# Patient Record
Sex: FEMALE | Race: WHITE | NOT HISPANIC OR LATINO | Employment: FULL TIME | URBAN - METROPOLITAN AREA
[De-identification: names, ages, dates, MRNs, and addresses within clinical notes are randomized per-mention and may not be internally consistent; named-entity substitution may affect disease eponyms.]

---

## 2023-05-16 ENCOUNTER — OFFICE VISIT (OUTPATIENT)
Dept: INTERNAL MEDICINE | Facility: CLINIC | Age: 48
End: 2023-05-16
Payer: MEDICAID

## 2023-05-16 VITALS
BODY MASS INDEX: 25.79 KG/M2 | HEART RATE: 83 BPM | SYSTOLIC BLOOD PRESSURE: 110 MMHG | TEMPERATURE: 98.4 F | WEIGHT: 170.2 LBS | DIASTOLIC BLOOD PRESSURE: 80 MMHG | HEIGHT: 68 IN | OXYGEN SATURATION: 95 %

## 2023-05-16 DIAGNOSIS — Z13.1 SCREENING FOR DIABETES MELLITUS: ICD-10-CM

## 2023-05-16 DIAGNOSIS — Z13.220 SCREENING FOR HYPERLIPIDEMIA: ICD-10-CM

## 2023-05-16 DIAGNOSIS — M72.2 PLANTAR FASCIITIS: ICD-10-CM

## 2023-05-16 DIAGNOSIS — Z13.0 SCREENING FOR DEFICIENCY ANEMIA: ICD-10-CM

## 2023-05-16 DIAGNOSIS — Z13.29 SCREENING FOR HYPOTHYROIDISM: ICD-10-CM

## 2023-05-16 DIAGNOSIS — M62.08 DIASTASIS RECTI: ICD-10-CM

## 2023-05-16 DIAGNOSIS — K80.20 CALCULUS OF GALLBLADDER WITHOUT CHOLECYSTITIS WITHOUT OBSTRUCTION: Primary | ICD-10-CM

## 2023-05-16 DIAGNOSIS — L72.0 CYST OF SKIN AND SUBCUTANEOUS TISSUE: ICD-10-CM

## 2023-05-16 PROCEDURE — 99203 OFFICE O/P NEW LOW 30 MIN: CPT | Performed by: INTERNAL MEDICINE

## 2023-05-16 PROCEDURE — 1160F RVW MEDS BY RX/DR IN RCRD: CPT | Performed by: INTERNAL MEDICINE

## 2023-05-16 PROCEDURE — 1159F MED LIST DOCD IN RCRD: CPT | Performed by: INTERNAL MEDICINE

## 2023-05-16 NOTE — PROGRESS NOTES
"Chief Complaint  Establish Care (Moved from [place], wanting labs) and Abdominal Pain (From )    Subjective        Mack Sorensen presents to [facility] PRIMARY CARE  History of Present Illness     Ms. Sorensen is a ramón 47 yo F who presents to establish care and discuss allergies, post-partum changes after <1 yo.  Had first  as well.     Goal weight of 155 lbs.  Had been on Adipex and Topamax to help with weight loss and she did lose 20 lbs, but had some bleeding with diarrhea.  Now this has stopped since she stopped Adipex and Topamax.     Plantar fasciitis: recommended inserts, exercises      Objective   Vital Signs:  /80 (BP Location: Left arm, Patient Position: Sitting, Cuff Size: Adult)   Pulse 83   Temp 98.4 °F (36.9 °C) (Temporal)   Ht 172.7 cm (68\")   Wt 77.2 kg (170 lb 3.2 oz)   SpO2 95%   BMI 25.88 kg/m²   Estimated body mass index is 25.88 kg/m² as calculated from the following:    Height as of this encounter: 172.7 cm (68\").    Weight as of this encounter: 77.2 kg (170 lb 3.2 oz).       BMI is >= 25 and <30. (Overweight) The following options were offered after discussion;: exercise counseling/recommendations and nutrition counseling/recommendations      Physical Exam  Vitals reviewed.   Constitutional:       General: She is not in acute distress.     Appearance: Normal appearance.   HENT:      Head: Normocephalic and atraumatic.      Nose: Nose normal.      Mouth/Throat:      Mouth: Mucous membranes are moist.   Eyes:      Conjunctiva/sclera: Conjunctivae normal.   Cardiovascular:      Rate and Rhythm: Normal rate and regular rhythm.      Pulses: Normal pulses.      Heart sounds: Normal heart sounds.   Pulmonary:      Effort: Pulmonary effort is normal.      Breath sounds: Normal breath sounds.   Abdominal:      Palpations: Abdomen is soft.      Tenderness: There is no abdominal tenderness.   Musculoskeletal:      Right lower leg: No edema.      Left lower " leg: No edema.   Skin:     General: Skin is warm and dry.   Neurological:      General: No focal deficit present.      Mental Status: She is alert.   Psychiatric:         Mood and Affect: Mood normal.        Result Review :  The following data was reviewed by: Anjelica Holguin MD on 05/16/2023:  Common labs        5/16/2023    11:37   Common Labs   Glucose 95     BUN 13     Creatinine 0.76     Sodium 138     Potassium 4.2     Chloride 102     Calcium 10.1     Total Protein 7.8     Albumin 4.8     Total Bilirubin 0.4     Alkaline Phosphatase 63     AST (SGOT) 15     ALT (SGPT) 22     WBC 5.57     Hemoglobin 12.2     Hematocrit 37.1     Platelets 271     Total Cholesterol 231     Triglycerides 82     HDL Cholesterol 60     LDL Cholesterol  157     Hemoglobin A1C 5.30       Data reviewed: imaging available in the system, CT from 2021 reviewed with diastasis recti and cholelithiasis             Assessment and Plan   Diagnoses and all orders for this visit:    1. Calculus of gallbladder without cholecystitis without obstruction (Primary)  -     Comprehensive Metabolic Panel    2. Screening for hypothyroidism  -     T4, Free  -     TSH    3. Screening for deficiency anemia  -     CBC & Differential    4. Screening for diabetes mellitus  -     Hemoglobin A1c    5. Screening for hyperlipidemia  -     Lipid Panel With LDL / HDL Ratio    6. Diastasis recti  Assessment & Plan:  Message sent to general surgery to see if they would be appropriate referral      7. Cyst of skin and subcutaneous tissue  -     US soft tissue    8. Plantar fasciitis  Assessment & Plan:  Exercises, supportive shoes, inserts described and discussed how to use             Follow Up   No follow-ups on file.  Patient was given instructions and counseling regarding her condition or for health maintenance advice. Please see specific information pulled into the AVS if appropriate.

## 2023-05-17 LAB
ALBUMIN SERPL-MCNC: 4.8 G/DL (ref 3.5–5.2)
ALBUMIN/GLOB SERPL: 1.6 G/DL
ALP SERPL-CCNC: 63 U/L (ref 39–117)
ALT SERPL-CCNC: 22 U/L (ref 1–33)
AST SERPL-CCNC: 15 U/L (ref 1–32)
BASOPHILS # BLD AUTO: 0.04 10*3/MM3 (ref 0–0.2)
BASOPHILS NFR BLD AUTO: 0.7 % (ref 0–1.5)
BILIRUB SERPL-MCNC: 0.4 MG/DL (ref 0–1.2)
BUN SERPL-MCNC: 13 MG/DL (ref 6–20)
BUN/CREAT SERPL: 17.1 (ref 7–25)
CALCIUM SERPL-MCNC: 10.1 MG/DL (ref 8.6–10.5)
CHLORIDE SERPL-SCNC: 102 MMOL/L (ref 98–107)
CHOLEST SERPL-MCNC: 231 MG/DL (ref 0–200)
CO2 SERPL-SCNC: 27.5 MMOL/L (ref 22–29)
CREAT SERPL-MCNC: 0.76 MG/DL (ref 0.57–1)
EGFRCR SERPLBLD CKD-EPI 2021: 96.2 ML/MIN/1.73
EOSINOPHIL # BLD AUTO: 0.16 10*3/MM3 (ref 0–0.4)
EOSINOPHIL NFR BLD AUTO: 2.9 % (ref 0.3–6.2)
ERYTHROCYTE [DISTWIDTH] IN BLOOD BY AUTOMATED COUNT: 13.2 % (ref 12.3–15.4)
GLOBULIN SER CALC-MCNC: 3 GM/DL
GLUCOSE SERPL-MCNC: 95 MG/DL (ref 65–99)
HBA1C MFR BLD: 5.3 % (ref 4.8–5.6)
HCT VFR BLD AUTO: 37.1 % (ref 34–46.6)
HDLC SERPL-MCNC: 60 MG/DL (ref 40–60)
HGB BLD-MCNC: 12.2 G/DL (ref 12–15.9)
IMM GRANULOCYTES # BLD AUTO: 0.01 10*3/MM3 (ref 0–0.05)
IMM GRANULOCYTES NFR BLD AUTO: 0.2 % (ref 0–0.5)
LDLC SERPL CALC-MCNC: 157 MG/DL (ref 0–100)
LDLC/HDLC SERPL: 2.58 {RATIO}
LYMPHOCYTES # BLD AUTO: 1.57 10*3/MM3 (ref 0.7–3.1)
LYMPHOCYTES NFR BLD AUTO: 28.2 % (ref 19.6–45.3)
MCH RBC QN AUTO: 29.2 PG (ref 26.6–33)
MCHC RBC AUTO-ENTMCNC: 32.9 G/DL (ref 31.5–35.7)
MCV RBC AUTO: 88.8 FL (ref 79–97)
MONOCYTES # BLD AUTO: 0.71 10*3/MM3 (ref 0.1–0.9)
MONOCYTES NFR BLD AUTO: 12.7 % (ref 5–12)
NEUTROPHILS # BLD AUTO: 3.08 10*3/MM3 (ref 1.7–7)
NEUTROPHILS NFR BLD AUTO: 55.3 % (ref 42.7–76)
NRBC BLD AUTO-RTO: 0 /100 WBC (ref 0–0.2)
PLATELET # BLD AUTO: 271 10*3/MM3 (ref 140–450)
POTASSIUM SERPL-SCNC: 4.2 MMOL/L (ref 3.5–5.2)
PROT SERPL-MCNC: 7.8 G/DL (ref 6–8.5)
RBC # BLD AUTO: 4.18 10*6/MM3 (ref 3.77–5.28)
SODIUM SERPL-SCNC: 138 MMOL/L (ref 136–145)
T4 FREE SERPL-MCNC: 1 NG/DL (ref 0.93–1.7)
TRIGL SERPL-MCNC: 82 MG/DL (ref 0–150)
TSH SERPL DL<=0.005 MIU/L-ACNC: 1.9 UIU/ML (ref 0.27–4.2)
VLDLC SERPL CALC-MCNC: 14 MG/DL (ref 5–40)
WBC # BLD AUTO: 5.57 10*3/MM3 (ref 3.4–10.8)

## 2023-05-18 PROBLEM — M72.2 PLANTAR FASCIITIS: Status: ACTIVE | Noted: 2023-05-18

## 2023-05-18 PROBLEM — M62.08 DIASTASIS RECTI: Status: ACTIVE | Noted: 2023-05-18

## 2023-05-18 PROBLEM — K80.20 CALCULUS OF GALLBLADDER WITHOUT CHOLECYSTITIS WITHOUT OBSTRUCTION: Status: ACTIVE | Noted: 2023-05-18

## 2023-05-19 ENCOUNTER — TELEPHONE (OUTPATIENT)
Dept: INTERNAL MEDICINE | Facility: CLINIC | Age: 48
End: 2023-05-19
Payer: MEDICAID

## 2023-05-19 NOTE — TELEPHONE ENCOUNTER
Pt calls wants her US placed as URGENT. Per pt she can't get a soon appt w/o the order getting placed as STAT. I advise that she could go to Urgent care if she was having that much pain.  Pt wants a call back to let her know if she is getting the order placed or what she needs to do because she is having more pain. Thank you

## 2023-05-22 ENCOUNTER — HOSPITAL ENCOUNTER (OUTPATIENT)
Dept: ULTRASOUND IMAGING | Facility: HOSPITAL | Age: 48
Discharge: HOME | End: 2023-05-22
Admitting: INTERNAL MEDICINE
Payer: MEDICAID

## 2023-05-22 PROCEDURE — 76999 ECHO EXAMINATION PROCEDURE: CPT

## 2023-05-23 ENCOUNTER — OFFICE VISIT (OUTPATIENT)
Dept: ORTHOPEDIC SURGERY | Facility: CLINIC | Age: 48
End: 2023-05-23
Payer: MEDICAID

## 2023-05-23 VITALS
WEIGHT: 170 LBS | DIASTOLIC BLOOD PRESSURE: 78 MMHG | HEIGHT: 68 IN | SYSTOLIC BLOOD PRESSURE: 115 MMHG | BODY MASS INDEX: 25.76 KG/M2 | HEART RATE: 66 BPM

## 2023-05-23 DIAGNOSIS — L02.416 ABSCESS OF SKIN OF LEFT KNEE: Primary | ICD-10-CM

## 2023-05-23 RX ORDER — SENNOSIDES 8.6 MG
650 CAPSULE ORAL EVERY 8 HOURS PRN
COMMUNITY

## 2023-05-23 RX ORDER — SULFAMETHOXAZOLE AND TRIMETHOPRIM 800; 160 MG/1; MG/1
2 TABLET ORAL 2 TIMES DAILY
COMMUNITY

## 2023-05-23 RX ORDER — IBUPROFEN 600 MG/1
600 TABLET ORAL EVERY 6 HOURS PRN
COMMUNITY

## 2023-05-23 NOTE — PROGRESS NOTES
"Subjective:     Patient ID: Mack Sorensen is a 48 y.o. female.    Chief Complaint:    History of Present Illness  Mack Sorensen presents to clinic today for evaluation of left knee prepatellar cyst/abscess.  The patient states that she is had multiple issues with this area flaring up intermittently in the past since a puncture wound to the area in roughly 2016.  She is not sure exactly what she punctured her knee on but she does spend quite a bit of time on her knees as reported by the patient.  She states that a few days ago she started to notice that it flared up again and she was given antibiotics (Bactrim DS twice daily) but only took it 1 or 2 days and is starting to get better so she stopped.  About a day or 2 ago it started to flare back up again so she began taking antibiotics again and had an ultrasound at the recommendation of Dr. Holguin.  The patient presents to me today for further evaluation and management.  She states that it is extremely tender and she did note some purulent drainage the day prior but states that today the area is only half the size that it was a day before and she does feel as though it is improving.  She has only taking 2 antibiotic tablets prior to seeing me.     Social History     Occupational History   • Not on file   Tobacco Use   • Smoking status: Never   • Smokeless tobacco: Never   Substance and Sexual Activity   • Alcohol use: Yes     Alcohol/week: 1.0 standard drink     Types: 1 Drinks containing 0.5 oz of alcohol per week   • Drug use: Never   • Sexual activity: Yes     Partners: Male     Birth control/protection: Tubal ligation     Comment: 07/26/2021      History reviewed. No pertinent past medical history.  No past surgical history on file.    Family History   Problem Relation Age of Onset   • Diabetes Mother                  Objective:  Vitals:    05/23/23 1506   BP: 115/78   Pulse: 66   Weight: 77.1 kg (170 lb)   Height: 172.7 cm (68\")         05/23/23  1506   Weight: " 77.1 kg (170 lb)     Body mass index is 25.85 kg/m².        Left Knee Exam     Muscle Strength   The patient has normal left knee strength.    Tenderness   Left knee tenderness location: prepatellar bursa/abscess.    Range of Motion   Extension: normal   Flexion: normal     Tests   Chirag:  Medial - negative Lateral - negative  Varus: negative Valgus: negative  Drawer:  Anterior - negative     Posterior - negative    Other   Erythema: present  Scars: absent  Sensation: normal  Pulse: present  Swelling: moderate  Effusion: no effusion present    Comments:  Small dime sized area of erythema induration just inferior to the patella.  No active fluctuance or purulence noted               Imagin views of the left knee were ordered and reviewed by myself in the office today  Indication: Left knee pain  Findings: X-rays demonstrate no acute osseous abnormality.  No degenerative changes noted.  No signs of fracture dislocation or subluxation.  No suprapatellar effusion there is anterior infrapatellar soft tissue swelling  Comparative studies: None    Ultrasound was reviewed by myself in the office today demonstrating likely a infrapatellar abscess/septic bursitis    Assessment:        1. Abscess of skin of left knee           Plan:          1. Discussed treatment options at length with patient at today's visit.  I discussed with the patient that at this point in time since she states that the red area and swelling has decreased by more than half with just 1 day of oral antibiotics I would recommend continued observation and oral antibiotic therapy.  She has been taking Bactrim DS twice daily at the discretion of her primary care doctor.  I recommend she continue with the Bactrim DS twice daily for total of 7 days.  I will plan to see the patient back next Monday for repeat evaluation if at that point time the area has either not resolved or has grown we will consider surgical incision and drainage and possible  bursectomy.  I discussed with her that if she notices increasing swelling pain redness or drainage over the next few days to please call the office and get in touch with me and we can come up with next steps.  I am not eager to treat her abscess surgically since it has shown promising results with just 1 day 1 antibiotic therapy.  I also recommended warm compresses and have some bath warm water soaks  2. Follow up: 1 week without x-rays      Mack Sorensen was in agreement with plan and had all questions answered.     Medications:  No orders of the defined types were placed in this encounter.      Followup:  No follow-ups on file.    Diagnoses and all orders for this visit:    1. Abscess of skin of left knee (Primary)  -     XR Knee 3+ View With [place] Left          Dictated utilizing Dragon dictation

## 2023-06-05 ENCOUNTER — TELEPHONE (OUTPATIENT)
Dept: INTERNAL MEDICINE | Facility: CLINIC | Age: 48
End: 2023-06-05
Payer: MEDICAID

## 2023-06-05 DIAGNOSIS — K43.9 HERNIA OF ANTERIOR ABDOMINAL WALL: ICD-10-CM

## 2023-06-05 DIAGNOSIS — M62.08 DIASTASIS RECTI: Primary | ICD-10-CM

## 2023-06-05 NOTE — PROGRESS NOTES
Spoke with patient today.  She is having worsening abdominal pain but no skin changes over hernia, vomiting, constipation, other signs of obstruction.  Advised if she is in severe pain to go to ED, but also advised on exact signs of obstruction.  Referral for general surgery placed as well as CT of abdomen for surgical planning purposes.

## 2023-06-05 NOTE — TELEPHONE ENCOUNTER
I told her to go to the ER having severe gut pain and the fever and sweats.  She would like to speak with Dr. Holguin first.  She said she was suppose to have a general surgeon or plastic surgeon call her?   She was told to go to the er

## 2023-06-15 ENCOUNTER — HOSPITAL ENCOUNTER (OUTPATIENT)
Dept: CT IMAGING | Facility: HOSPITAL | Age: 48
Discharge: HOME | End: 2023-06-15
Admitting: INTERNAL MEDICINE
Payer: MEDICAID

## 2023-06-15 DIAGNOSIS — K43.9 HERNIA OF ANTERIOR ABDOMINAL WALL: ICD-10-CM

## 2023-06-15 DIAGNOSIS — M62.08 DIASTASIS RECTI: ICD-10-CM

## 2023-06-15 PROCEDURE — 74176 CT ABD & PELVIS W/O CONTRAST: CPT

## 2023-06-19 ENCOUNTER — OFFICE VISIT (OUTPATIENT)
Dept: SURGERY | Facility: CLINIC | Age: 48
End: 2023-06-19
Payer: MEDICAID

## 2023-06-19 VITALS
SYSTOLIC BLOOD PRESSURE: 124 MMHG | HEART RATE: 72 BPM | WEIGHT: 175 LBS | BODY MASS INDEX: 26.52 KG/M2 | DIASTOLIC BLOOD PRESSURE: 76 MMHG | RESPIRATION RATE: 16 BRPM | HEIGHT: 68 IN

## 2023-06-19 DIAGNOSIS — K43.2 INCISIONAL HERNIA, WITHOUT OBSTRUCTION OR GANGRENE: Primary | ICD-10-CM

## 2023-06-19 PROCEDURE — 1160F RVW MEDS BY RX/DR IN RCRD: CPT | Performed by: SURGERY

## 2023-06-19 PROCEDURE — 1159F MED LIST DOCD IN RCRD: CPT | Performed by: SURGERY

## 2023-06-19 PROCEDURE — 99204 OFFICE O/P NEW MOD 45 MIN: CPT | Performed by: SURGERY

## 2023-06-19 NOTE — H&P
Mack Sorensen 48 y.o. female presents @ the req of Dr. Holguin  for eval of poss abd hernia.  Pt states she had an emergency  2021 and since that time she has noticed abd pain and bulging.  + pain.  Wears abd binder to help with the pain.  Pt reports this was her 1st , 7 other preg with vag delivery.   Chief Complaint   Patient presents with    Abdominal Pain             HPI   Above noted and agree.  This very pleasant 48-year-old female is here with an incisional hernia.  She had an emergency  almost 2 years ago.  At that time she had a small incisional hernia and it has grown in size.  She has had 8 pregnancies total and was told it was just diastases rectus.  She does get crampy abdominal pain after eating.  She has no chest pain or shortness of breath.  She has no fevers or chills.  She has to wear a binder.      Review of Systems   All other systems reviewed and are negative.          Current Outpatient Medications:     acetaminophen (TYLENOL) 650 MG 8 hr tablet, Take 1 tablet by mouth Every 8 (Eight) Hours As Needed for Mild Pain., Disp: , Rfl:     ibuprofen (ADVIL,MOTRIN) 600 MG tablet, Take 1 tablet by mouth Every 6 (Six) Hours As Needed for Mild Pain., Disp: , Rfl:         No Known Allergies        Past Medical History:   Diagnosis Date    Cholelithiasis 2021           No past surgical history on file.        Social History     Tobacco Use    Smoking status: Never    Smokeless tobacco: Never   Substance Use Topics    Alcohol use: Yes     Alcohol/week: 1.0 standard drink     Types: 1 Drinks containing 0.5 oz of alcohol per week    Drug use: Never             There is no immunization history on file for this patient.        Physical Exam  Vitals and nursing note reviewed.   Constitutional:       Appearance: Normal appearance.   HENT:      Head: Normocephalic and atraumatic.   Cardiovascular:      Rate and Rhythm: Normal rate and regular rhythm.   Pulmonary:      Effort:  "Pulmonary effort is normal.      Breath sounds: Normal breath sounds.   Abdominal:      General: Bowel sounds are normal.      Palpations: Abdomen is soft.      Comments: Incisional hernia noted in lower abdomen   Musculoskeletal:         General: No swelling or tenderness.   Skin:     General: Skin is warm and dry.   Neurological:      General: No focal deficit present.      Mental Status: She is alert and oriented to person, place, and time.   Psychiatric:         Mood and Affect: Mood normal.         Behavior: Behavior normal.       Debilities/Disabilities Identified: None    Emotional Behavior: Appropriate      /76   Pulse 72   Resp 16   Ht 172.7 cm (68\")   Wt 79.4 kg (175 lb)   BMI 26.61 kg/m²         Diagnoses and all orders for this visit:    1. Incisional hernia, without obstruction or gangrene (Primary)       The risks and benefits of repairing this hernia laparoscopically were discussed with Mack.  Benefits and risks, not limited to but including:  Bleeding, infection, recurrence, formation of a hematoma or seroma, injury to intra-abdominal structures, DVT, PE, atelectasis, pneumonia, anesthesia complications.  She appeared to understand and is willing to proceed.    Thank you for allowing me to participate in the care of this interesting patient.           "

## 2023-06-19 NOTE — LETTER
2023       No Recipients    Patient: Mack Sorensen   YOB: 1975   Date of Visit: 2023       Dear Dr. Jimenez Recipients:    Thank you for referring Mack Sorensen to me for evaluation. Below are the relevant portions of my assessment and plan of care.    If you have questions, please do not hesitate to call me. I look forward to following Mack along with you.         Sincerely,        Hawa Guerra DO        CC:   No Recipients    Hawa Guerra DO  23 1004  Sign when Signing Visit  Mack Sorensen 48 y.o. female presents @ the req of Dr. Holguin  for eval of poss abd hernia.  Pt states she had an emergency  2021 and since that time she has noticed abd pain and bulging.  + pain.  Wears abd binder to help with the pain.  Pt reports this was her 1st , 7 other preg with vag delivery.   Chief Complaint   Patient presents with   • Abdominal Pain             HPI   Above noted and agree.  This very pleasant 48-year-old female is here with an incisional hernia.  She had an emergency  almost 2 years ago.  At that time she had a small incisional hernia and it has grown in size.  She has had 8 pregnancies total and was told it was just diastases rectus.  She does get crampy abdominal pain after eating.  She has no chest pain or shortness of breath.  She has no fevers or chills.  She has to wear a binder.      Review of Systems   All other systems reviewed and are negative.          Current Outpatient Medications:   •  acetaminophen (TYLENOL) 650 MG 8 hr tablet, Take 1 tablet by mouth Every 8 (Eight) Hours As Needed for Mild Pain., Disp: , Rfl:   •  ibuprofen (ADVIL,MOTRIN) 600 MG tablet, Take 1 tablet by mouth Every 6 (Six) Hours As Needed for Mild Pain., Disp: , Rfl:         No Known Allergies        Past Medical History:   Diagnosis Date   • Cholelithiasis 2021           No past surgical history on file.        Social History     Tobacco Use   • Smoking  "status: Never   • Smokeless tobacco: Never   Substance Use Topics   • Alcohol use: Yes     Alcohol/week: 1.0 standard drink     Types: 1 Drinks containing 0.5 oz of alcohol per week   • Drug use: Never             There is no immunization history on file for this patient.        Physical Exam  Vitals and nursing note reviewed.   Constitutional:       Appearance: Normal appearance.   HENT:      Head: Normocephalic and atraumatic.   Cardiovascular:      Rate and Rhythm: Normal rate and regular rhythm.   Pulmonary:      Effort: Pulmonary effort is normal.      Breath sounds: Normal breath sounds.   Abdominal:      General: Bowel sounds are normal.      Palpations: Abdomen is soft.      Comments: Incisional hernia noted in lower abdomen   Musculoskeletal:         General: No swelling or tenderness.   Skin:     General: Skin is warm and dry.   Neurological:      General: No focal deficit present.      Mental Status: She is alert and oriented to person, place, and time.   Psychiatric:         Mood and Affect: Mood normal.         Behavior: Behavior normal.       Debilities/Disabilities Identified: None    Emotional Behavior: Appropriate      /76   Pulse 72   Resp 16   Ht 172.7 cm (68\")   Wt 79.4 kg (175 lb)   BMI 26.61 kg/m²         Diagnoses and all orders for this visit:    1. Incisional hernia, without obstruction or gangrene (Primary)       The risks and benefits of repairing this hernia laparoscopically were discussed with Mack.  Benefits and risks, not limited to but including:  Bleeding, infection, recurrence, formation of a hematoma or seroma, injury to intra-abdominal structures, DVT, PE, atelectasis, pneumonia, anesthesia complications.  She appeared to understand and is willing to proceed.    Thank you for allowing me to participate in the care of this interesting patient.         "

## 2023-06-20 PROBLEM — K43.2 INCISIONAL HERNIA, WITHOUT OBSTRUCTION OR GANGRENE: Status: ACTIVE | Noted: 2023-06-20

## 2023-07-13 PROBLEM — K43.2 INCISIONAL HERNIA, WITHOUT OBSTRUCTION OR GANGRENE: Status: RESOLVED | Noted: 2023-06-20 | Resolved: 2023-07-13

## 2023-07-24 DIAGNOSIS — Z98.890 S/P LAPAROSCOPIC HERNIA REPAIR: Primary | ICD-10-CM

## 2023-07-24 DIAGNOSIS — Z87.19 S/P LAPAROSCOPIC HERNIA REPAIR: Primary | ICD-10-CM

## 2023-07-24 RX ORDER — OXYCODONE AND ACETAMINOPHEN 7.5; 325 MG/1; MG/1
1 TABLET ORAL EVERY 6 HOURS PRN
Qty: 20 TABLET | Refills: 0 | Status: SHIPPED | OUTPATIENT
Start: 2023-07-24 | End: 2024-07-23

## 2023-08-04 ENCOUNTER — OFFICE VISIT (OUTPATIENT)
Dept: SURGERY | Facility: CLINIC | Age: 48
End: 2023-08-04
Payer: MEDICAID

## 2023-08-04 DIAGNOSIS — Z98.890 STATUS POST REPAIR OF VENTRAL HERNIA: Primary | ICD-10-CM

## 2023-08-04 DIAGNOSIS — Z87.19 STATUS POST REPAIR OF VENTRAL HERNIA: Primary | ICD-10-CM

## 2023-08-04 PROBLEM — M62.08 DIASTASIS RECTI: Status: RESOLVED | Noted: 2023-05-18 | Resolved: 2023-08-04

## 2023-08-04 RX ORDER — OMEPRAZOLE 20 MG/1
20 CAPSULE, DELAYED RELEASE ORAL DAILY
COMMUNITY

## 2023-08-14 ENCOUNTER — OFFICE VISIT (OUTPATIENT)
Dept: INTERNAL MEDICINE | Facility: CLINIC | Age: 48
End: 2023-08-14
Payer: MEDICAID

## 2023-08-14 VITALS
HEART RATE: 71 BPM | BODY MASS INDEX: 25.98 KG/M2 | TEMPERATURE: 97.7 F | DIASTOLIC BLOOD PRESSURE: 60 MMHG | SYSTOLIC BLOOD PRESSURE: 104 MMHG | WEIGHT: 171.4 LBS | HEIGHT: 68 IN | OXYGEN SATURATION: 100 %

## 2023-08-14 DIAGNOSIS — K21.9 GASTROESOPHAGEAL REFLUX DISEASE, UNSPECIFIED WHETHER ESOPHAGITIS PRESENT: Primary | ICD-10-CM

## 2023-08-14 DIAGNOSIS — K92.1 TARRY STOOL: ICD-10-CM

## 2023-08-14 DIAGNOSIS — M67.40 GANGLION CYST: ICD-10-CM

## 2023-08-14 PROCEDURE — 1160F RVW MEDS BY RX/DR IN RCRD: CPT | Performed by: INTERNAL MEDICINE

## 2023-08-14 PROCEDURE — 1159F MED LIST DOCD IN RCRD: CPT | Performed by: INTERNAL MEDICINE

## 2023-08-14 PROCEDURE — 99213 OFFICE O/P EST LOW 20 MIN: CPT | Performed by: INTERNAL MEDICINE

## 2023-08-14 RX ORDER — OMEPRAZOLE 20 MG/1
20 CAPSULE, DELAYED RELEASE ORAL DAILY
Qty: 90 CAPSULE | Refills: 3 | Status: SHIPPED | OUTPATIENT
Start: 2023-08-14

## 2023-08-14 NOTE — PROGRESS NOTES
"      Mack Sorensen is a 48 y.o. female who presents with a chief complaint of   Chief Complaint   Patient presents with    Post-op     7/13 hernia repair       HPI     Doing okay post-op.  Has a f/u with Dr. Guerra on 9/1/23.  Stools have been tarry, black, sticky.  She has only noticed the changes to BM since surgery.      The following portions of the patient's history were reviewed and updated as appropriate: allergies, current medications, past family history, past medical history, past social history, past surgical history and problem list.      Current Outpatient Medications:     omeprazole (priLOSEC) 20 MG capsule, Take 1 capsule by mouth Daily., Disp: 90 capsule, Rfl: 3            Physical Exam  /60 (BP Location: Left arm, Patient Position: Sitting, Cuff Size: Adult)   Pulse 71   Temp 97.7 øF (36.5 øC) (Infrared)   Ht 172.7 cm (68\")   Wt 77.7 kg (171 lb 6.4 oz)   SpO2 100%   BMI 26.06 kg/mý     Physical Exam  Vitals reviewed.   Constitutional:       General: She is not in acute distress.     Appearance: Normal appearance.   HENT:      Head: Normocephalic and atraumatic.      Nose: Nose normal.      Mouth/Throat:      Mouth: Mucous membranes are moist.   Eyes:      Conjunctiva/sclera: Conjunctivae normal.   Pulmonary:      Effort: Pulmonary effort is normal.   Abdominal:      Palpations: Abdomen is soft.   Skin:     General: Skin is warm and dry.   Neurological:      General: No focal deficit present.      Mental Status: She is alert.   Psychiatric:         Mood and Affect: Mood normal.         Results for orders placed or performed in visit on 07/07/23   CBC (No Diff)    Specimen: Blood   Result Value Ref Range    WBC 7.26 3.40 - 10.80 10*3/mm3    RBC 3.88 3.77 - 5.28 10*6/mm3    Hemoglobin 11.8 (L) 12.0 - 15.9 g/dL    Hematocrit 35.6 34.0 - 46.6 %    MCV 91.8 79.0 - 97.0 fL    MCH 30.4 26.6 - 33.0 pg    MCHC 33.1 31.5 - 35.7 g/dL    RDW 13.8 12.3 - 15.4 %    RDW-SD 46.3 37.0 - 54.0 fl    MPV " 10.1 6.0 - 12.0 fL    Platelets 267 140 - 450 10*3/mm3   hCG, Serum, Qualitative    Specimen: Blood   Result Value Ref Range    HCG Qualitative Negative Negative           Diagnoses and all orders for this visit:    1. Gastroesophageal reflux disease, unspecified whether esophagitis present (Primary)  -     omeprazole (priLOSEC) 20 MG capsule; Take 1 capsule by mouth Daily.  Dispense: 90 capsule; Refill: 3    2. Tarry stool    3. Ganglion cyst  -     US soft tissue      Post op doing well overall.  Is having some tarry stools.  Will f/u with Mari and I will check with her to see if this could be accepted.

## 2023-08-21 ENCOUNTER — TELEPHONE (OUTPATIENT)
Dept: SURGERY | Facility: CLINIC | Age: 48
End: 2023-08-21
Payer: MEDICAID

## 2023-08-21 DIAGNOSIS — Z98.890 S/P LAPAROSCOPIC HERNIA REPAIR: ICD-10-CM

## 2023-08-21 DIAGNOSIS — Z87.19 S/P LAPAROSCOPIC HERNIA REPAIR: ICD-10-CM

## 2023-08-21 DIAGNOSIS — K92.1 TARRY STOOL: Primary | ICD-10-CM

## 2023-08-21 NOTE — TELEPHONE ENCOUNTER
Pt called and reports she cont to pain in LEFT abd. Reports pain is much worse since last OV.  Pt states the pain occurs at different times and is excruciating.  Pt states she saw her PCP after last OV with Dr. Guerra and nothing was found. Rev'd pt's chart and noted she reports tarry stools to PCP.  Explained to pt she needs OV to discuss this c/o and possible endoscopy.   Offered pt appt 08/23/23 @ [place] location or 08/25/23 @ [facility] location, or go to ER if she feels like this is emergent.  Pt asks how OV would be helpful dx'ing her prob.  I explained Dr. Guerra would listen to her concerns and do a physical exam to help determine the prob.  Pt denies temp/chills/N/V. Pt states she thinks she would prefer to have some type of scan first.  Discussed with Dr. Guerra and she orders CT abd/pel.  Pt informed and is agreeable and would like it sched ASAP as she is planning to go out of town 08/24/23-08/27/2023.    CT sched @ [facility] [place] 08/28/23 @ 2:00 pm.  Pt NPO 4 hr prior.  Phone call to inform pt.  Pt states she really doesn't want to wait until 08/28/23.  Again offered pt OV appt and option of going to ER if she felt necessary.  Pt states she would take an appt on Wed, talk with her  and decide if she needs to go to ER. Appt given.

## 2023-08-25 ENCOUNTER — TELEPHONE (OUTPATIENT)
Dept: SURGERY | Facility: CLINIC | Age: 48
End: 2023-08-25

## 2023-08-25 NOTE — TELEPHONE ENCOUNTER
[facility] called back and the approval is not going to be in before the 3:00pm cut off. They are sending to scheduling to reschedule appt.

## 2023-08-25 NOTE — TELEPHONE ENCOUNTER
Caller: [employer]    Relationship to patient:  [employer]    Best call back number: [phone]    Patient is needing: THEY NEED TO KNOW IF AUTH IS NOT APPROVED BY 3PM TODAY ; CAN THEY RESCHEDULE IMAGING. PLEASE GIVE HER A CALL BACK.

## 2023-08-31 ENCOUNTER — HOSPITAL ENCOUNTER (OUTPATIENT)
Dept: ULTRASOUND IMAGING | Facility: HOSPITAL | Age: 48
Discharge: HOME | End: 2023-08-31
Admitting: INTERNAL MEDICINE
Payer: MEDICAID

## 2023-08-31 PROCEDURE — 76999 ECHO EXAMINATION PROCEDURE: CPT

## 2023-09-06 ENCOUNTER — OFFICE VISIT (OUTPATIENT)
Dept: SURGERY | Facility: CLINIC | Age: 48
End: 2023-09-06
Payer: MEDICAID

## 2023-09-06 DIAGNOSIS — Z87.19 STATUS POST HERNIA REPAIR: Primary | ICD-10-CM

## 2023-09-06 DIAGNOSIS — Z98.890 STATUS POST HERNIA REPAIR: Primary | ICD-10-CM

## 2023-09-06 PROCEDURE — 1160F RVW MEDS BY RX/DR IN RCRD: CPT | Performed by: SURGERY

## 2023-09-06 PROCEDURE — 99213 OFFICE O/P EST LOW 20 MIN: CPT | Performed by: SURGERY

## 2023-09-06 PROCEDURE — 1159F MED LIST DOCD IN RCRD: CPT | Performed by: SURGERY

## 2023-09-06 NOTE — PROGRESS NOTES
Mack Sorensen 48 y.o. female presents for PO FU.  Pt states she is feeling better and her pain has resolved.       HPI     Above noted and agree.  Mack is feeling much better.  Her pain has improved.  She is tolerating a regular diet and moving her bowels well.    Review of Systems        Past Medical History:   Diagnosis Date    Cholelithiasis 2021           Past Surgical History:   Procedure Laterality Date     SECTION      POSTPARTUM TUBAL LIGATION      VEIN LIGATION AND STRIPPING      VENTRAL/INCISIONAL HERNIA REPAIR N/A 2023    Procedure: laparoscopic incisional hernia repair WITH MESH;  Surgeon: Hawa Guerra DO;  Location: [facility];  Service: General;  Laterality: N/A;           Physical Exam  General:  Awake and alert with no acute distress  Eyes:  No icterus  Abdomen:  Soft, non-tender  Incision:  Clean, dry, intact       There were no vitals taken for this visit.        Diagnoses and all orders for this visit:    1. Status post hernia repair (Primary)    Mack may resume normal activities.  She may return anytime as needed.    Thank you for allowing me to participate in the care of this interesting patient.

## 2023-09-06 NOTE — LETTER
2023     Anjelica Holguin MD  [address]  [facility] [address]  [facility] [address]    Patient: Mack Sorensen   YOB: 1975   Date of Visit: 2023     Dear Anjelica Holguin MD:       Thank you for referring Mack Sorensen to me for evaluation. Below are the relevant portions of my assessment and plan of care.    If you have questions, please do not hesitate to call me. I look forward to following Mack along with you.         Sincerely,        Hawa Guerra DO        CC: No Recipients    Hawa Guerra DO  23 1617  Sign when Signing Visit  Mack Sorensen 48 y.o. female presents for PO FU.  Pt states she is feeling better and her pain has resolved.       HPI     Above noted and agree.  Mack is feeling much better.  Her pain has improved.  She is tolerating a regular diet and moving her bowels well.    Review of Systems        Past Medical History:   Diagnosis Date   • Cholelithiasis 2021           Past Surgical History:   Procedure Laterality Date   •  SECTION     • POSTPARTUM TUBAL LIGATION     • VEIN LIGATION AND STRIPPING     • VENTRAL/INCISIONAL HERNIA REPAIR N/A 2023    Procedure: laparoscopic incisional hernia repair WITH MESH;  Surgeon: Hawa Guerra DO;  Location: [facility];  Service: General;  Laterality: N/A;           Physical Exam  General:  Awake and alert with no acute distress  Eyes:  No icterus  Abdomen:  Soft, non-tender  Incision:  Clean, dry, intact       There were no vitals taken for this visit.        Diagnoses and all orders for this visit:    1. Status post hernia repair (Primary)    Mack may resume normal activities.  She may return anytime as needed.    Thank you for allowing me to participate in the care of this interesting patient.

## 2023-12-13 ENCOUNTER — OFFICE VISIT (OUTPATIENT)
Dept: INTERNAL MEDICINE | Facility: CLINIC | Age: 48
End: 2023-12-13
Payer: MEDICAID

## 2023-12-13 VITALS
SYSTOLIC BLOOD PRESSURE: 122 MMHG | TEMPERATURE: 98 F | HEART RATE: 73 BPM | DIASTOLIC BLOOD PRESSURE: 80 MMHG | BODY MASS INDEX: 27.01 KG/M2 | OXYGEN SATURATION: 98 % | HEIGHT: 68 IN | WEIGHT: 178.2 LBS

## 2023-12-13 DIAGNOSIS — Z00.00 HEALTHCARE MAINTENANCE: Primary | ICD-10-CM

## 2023-12-13 DIAGNOSIS — Z12.4 SCREENING FOR CERVICAL CANCER: ICD-10-CM

## 2023-12-13 DIAGNOSIS — Z12.11 SCREENING FOR COLON CANCER: ICD-10-CM

## 2023-12-13 NOTE — PROGRESS NOTES
"Chief Complaint  Gynecologic Exam (Needs a cervical cancer screening), needs annual visit    Subjective        Mack Sorensen presents to [facility]  History of Present Illness    Desires pap today.     Objective   Vital Signs:  /80 (BP Location: Left arm, Patient Position: Sitting, Cuff Size: Adult)   Pulse 73   Temp 98 °F (36.7 °C) (Infrared)   Ht 172.7 cm (68\")   Wt 80.8 kg (178 lb 3.2 oz)   SpO2 98%   BMI 27.10 kg/m²   Estimated body mass index is 27.1 kg/m² as calculated from the following:    Height as of this encounter: 172.7 cm (68\").    Weight as of this encounter: 80.8 kg (178 lb 3.2 oz).               Physical Exam  Vitals reviewed.   Constitutional:       General: She is not in acute distress.     Appearance: Normal appearance.   HENT:      Head: Normocephalic and atraumatic.      Nose: Nose normal.      Mouth/Throat:      Mouth: Mucous membranes are moist.   Eyes:      Conjunctiva/sclera: Conjunctivae normal.   Cardiovascular:      Rate and Rhythm: Normal rate and regular rhythm.      Pulses: Normal pulses.      Heart sounds: Normal heart sounds.   Pulmonary:      Effort: Pulmonary effort is normal.      Breath sounds: Normal breath sounds.   Abdominal:      Palpations: Abdomen is soft.      Tenderness: There is no abdominal tenderness.   Skin:     General: Skin is warm and dry.   Neurological:      General: No focal deficit present.      Mental Status: She is alert.   Psychiatric:         Mood and Affect: Mood normal.        Result Review :  The following data was reviewed by: Anjelica Holguin MD on 12/13/2023:  Common labs          5/16/2023    11:37 7/7/2023    14:46   Common Labs   Glucose 95     BUN 13     Creatinine 0.76     Sodium 138     Potassium 4.2     Chloride 102     Calcium 10.1     Total Protein 7.8     Albumin 4.8     Total Bilirubin 0.4     Alkaline Phosphatase 63     AST (SGOT) 15     ALT (SGPT) 22     WBC 5.57  7.26    Hemoglobin 12.2  11.8  "   Hematocrit 37.1  35.6    Platelets 271  267    Total Cholesterol 231     Triglycerides 82     HDL Cholesterol 60     LDL Cholesterol  157     Hemoglobin A1C 5.30       Data reviewed : reviewed last note from Dr. Guerra             Assessment and Plan   Diagnoses and all orders for this visit:    1. Healthcare maintenance (Primary)    2. Screening for cervical cancer  -     IgP, Aptima HPV    3. Screening for colon cancer  -     Cologuard - Stool, Per Rectum; Future      Reviewed all pertinent vaccines for age and discussed healthy weight, exercise.  Patient will be screened with above labs and had physical exam and history taken.  Discussed ways to improve ASCVD health and general mental/physical health.     If cholesterol still up, will consider statin.          Follow Up   Return in about 6 months (around 6/13/2024) for f/u hyperlipidemia.  Patient was given instructions and counseling regarding her condition or for health maintenance advice. Please see specific information pulled into the AVS if appropriate.

## 2023-12-18 LAB
CYTOLOGIST CVX/VAG CYTO: NORMAL
CYTOLOGY CVX/VAG DOC CYTO: NORMAL
CYTOLOGY CVX/VAG DOC THIN PREP: NORMAL
DX ICD CODE: NORMAL
HIV 1 & 2 AB SER-IMP: NORMAL
HPV I/H RISK 4 DNA CVX QL PROBE+SIG AMP: NEGATIVE
Lab: NORMAL
OTHER STN SPEC: NORMAL
RECOM F/U CVX/VAG CYTO: NORMAL
STAT OF ADQ CVX/VAG CYTO-IMP: NORMAL

## 2024-01-08 DIAGNOSIS — Z12.4 CERVICAL CANCER SCREENING: ICD-10-CM

## 2024-01-08 DIAGNOSIS — Z00.00 HEALTHCARE MAINTENANCE: Primary | ICD-10-CM

## 2024-01-17 ENCOUNTER — TELEPHONE (OUTPATIENT)
Dept: INTERNAL MEDICINE | Facility: CLINIC | Age: 49
End: 2024-01-17
Payer: MEDICAID

## 2024-01-17 DIAGNOSIS — R10.11 RIGHT UPPER QUADRANT ABDOMINAL PAIN: Primary | ICD-10-CM

## 2024-01-17 NOTE — TELEPHONE ENCOUNTER
Caller: Mack Sorensen    Relationship: Self    Best call back number: [phone] (Home)     What is the best time to reach you: ANYTIME, ASAP    Who are you requesting to speak with (clinical staff, provider,  specific staff member): CLINICAL STAFF/ DR SELLERS    Do you know the name of the person who called: Mack Sorensen    What was the call regarding: PAIN FROM AROUND WAIST TO BACK AND PATIENT FELT LIKE SHE COULD NOT SWALLOW ANYTHING DUE TO GALLSTONE ATTACK, PATIENT STATES THIS HAS BEEN HER WORST FLARE UP SO FAR, PATIENT IS ASKING DR SELLERS IF SHE SHOULD COME SEE DR SELLERS FIRST OR IF SHE CAN BE REFERRED DIRECTLY TO GASTROENTEROLOGY, PLEASE ADVISE PATIENT REGARDING THIS ASAP    Is it okay if the provider responds through [facility]: PLEASE CALL PATIENT BACK REGARDING THIS ASAP

## 2024-01-18 NOTE — TELEPHONE ENCOUNTER
Abdominal ultrasound order placed to check out gallbladder, but if she is still having symptoms, come see me in clinic or go to ER.

## 2024-02-09 ENCOUNTER — HOSPITAL ENCOUNTER (OUTPATIENT)
Dept: ULTRASOUND IMAGING | Facility: HOSPITAL | Age: 49
Discharge: HOME | End: 2024-02-09
Payer: MEDICAID

## 2024-02-09 ENCOUNTER — PATIENT MESSAGE (OUTPATIENT)
Dept: INTERNAL MEDICINE | Facility: CLINIC | Age: 49
End: 2024-02-09
Payer: MEDICAID

## 2024-02-09 DIAGNOSIS — R10.11 RIGHT UPPER QUADRANT ABDOMINAL PAIN: ICD-10-CM

## 2024-02-09 PROCEDURE — 76705 ECHO EXAM OF ABDOMEN: CPT

## 2024-02-20 ENCOUNTER — TELEPHONE (OUTPATIENT)
Dept: INTERNAL MEDICINE | Facility: CLINIC | Age: 49
End: 2024-02-20
Payer: MEDICAID

## 2024-02-20 DIAGNOSIS — E66.3 OVERWEIGHT: ICD-10-CM

## 2024-02-20 DIAGNOSIS — E66.3 OVERWEIGHT: Primary | ICD-10-CM

## 2024-02-20 NOTE — TELEPHONE ENCOUNTER
Scheduled patient for [facility] appt UDS per PCP. Please place order as she will be coming in this afternoon.

## 2024-02-20 NOTE — TELEPHONE ENCOUNTER
From: Mack Sorensen  To: Anjelica Holguin  Sent: 2/9/2024 12:55 PM EST  Subject: Weight loss    I got you message. I would like to try the medication I was on last year that helped me lose 20lbs. I’ve tried losing the weight myself but it’s been difficult. I would like to try the medicine again if you think that’s ok. Thank you

## 2024-02-21 LAB
AMPHETAMINES UR QL SCN: NEGATIVE NG/ML
BARBITURATES UR QL SCN: NEGATIVE NG/ML
BENZODIAZ UR QL SCN: NEGATIVE NG/ML
BZE UR QL SCN: NEGATIVE NG/ML
CANNABINOIDS UR QL SCN: NEGATIVE NG/ML
CREAT UR-MCNC: 89.1 MG/DL (ref 20–300)
LABORATORY COMMENT REPORT: NORMAL
METHADONE UR QL SCN: NEGATIVE NG/ML
OPIATES UR QL SCN: NEGATIVE NG/ML
OXYCODONE+OXYMORPHONE UR QL SCN: NEGATIVE NG/ML
PCP UR QL: NEGATIVE NG/ML
PH UR: 5.9 [PH] (ref 4.5–8.9)
PROPOXYPH UR QL SCN: NEGATIVE NG/ML

## 2024-02-22 RX ORDER — PHENTERMINE HYDROCHLORIDE 37.5 MG/1
18.75 TABLET ORAL
Qty: 30 TABLET | Refills: 0 | Status: SHIPPED | OUTPATIENT
Start: 2024-02-22

## 2024-03-11 ENCOUNTER — TELEPHONE (OUTPATIENT)
Dept: INTERNAL MEDICINE | Facility: CLINIC | Age: 49
End: 2024-03-11

## 2024-03-11 NOTE — TELEPHONE ENCOUNTER
Caller: Mack Sorensen    Relationship: Self    Best call back number    [phone]        What was the call regarding: PATIENT HAS BEEN WAITING FOR A MEDICATION TO BE CALLED IN TO PHARMACY. IT WAS A COMBO PHENTERMINE AND TOPIMAX. PLEASE ADVISE     [facility]/pharmacy #[id number] - [place] - [address] AT [place] [address] - [phone] [facility] [phone]  [phone]

## 2024-04-19 ENCOUNTER — OFFICE VISIT (OUTPATIENT)
Dept: INTERNAL MEDICINE | Facility: CLINIC | Age: 49
End: 2024-04-19
Payer: MEDICAID

## 2024-04-19 ENCOUNTER — LAB (OUTPATIENT)
Dept: LAB | Facility: HOSPITAL | Age: 49
End: 2024-04-19
Payer: MEDICAID

## 2024-04-19 VITALS
DIASTOLIC BLOOD PRESSURE: 72 MMHG | SYSTOLIC BLOOD PRESSURE: 112 MMHG | OXYGEN SATURATION: 98 % | HEART RATE: 85 BPM | WEIGHT: 184.6 LBS | HEIGHT: 68 IN | BODY MASS INDEX: 27.98 KG/M2 | TEMPERATURE: 98 F

## 2024-04-19 DIAGNOSIS — N91.1 SECONDARY AMENORRHEA: ICD-10-CM

## 2024-04-19 DIAGNOSIS — E66.3 OVERWEIGHT (BMI 25.0-29.9): ICD-10-CM

## 2024-04-19 DIAGNOSIS — Z12.31 ENCOUNTER FOR SCREENING MAMMOGRAM FOR MALIGNANT NEOPLASM OF BREAST: ICD-10-CM

## 2024-04-19 DIAGNOSIS — M79.89 LEG SWELLING: ICD-10-CM

## 2024-04-19 DIAGNOSIS — D64.9 ANEMIA, UNSPECIFIED TYPE: ICD-10-CM

## 2024-04-19 DIAGNOSIS — Z81.8 FAMILY HISTORY OF DEMENTIA: Primary | ICD-10-CM

## 2024-04-19 LAB
25(OH)D3 SERPL-MCNC: 31.2 NG/ML (ref 30–100)
FERRITIN SERPL-MCNC: 54.5 NG/ML (ref 13–150)
FOLATE SERPL-MCNC: >20 NG/ML (ref 4.78–24.2)
FSH SERPL-ACNC: 40.1 MIU/ML
IRON 24H UR-MRATE: 52 MCG/DL (ref 37–145)
IRON SATN MFR SERPL: 15 % (ref 20–50)
TIBC SERPL-MCNC: 350 MCG/DL (ref 298–536)
TRANSFERRIN SERPL-MCNC: 235 MG/DL (ref 200–360)
VIT B12 BLD-MCNC: 548 PG/ML (ref 211–946)

## 2024-04-19 PROCEDURE — 82746 ASSAY OF FOLIC ACID SERUM: CPT | Performed by: INTERNAL MEDICINE

## 2024-04-19 PROCEDURE — 82728 ASSAY OF FERRITIN: CPT | Performed by: INTERNAL MEDICINE

## 2024-04-19 PROCEDURE — 36415 COLL VENOUS BLD VENIPUNCTURE: CPT | Performed by: INTERNAL MEDICINE

## 2024-04-19 PROCEDURE — 82306 VITAMIN D 25 HYDROXY: CPT | Performed by: INTERNAL MEDICINE

## 2024-04-19 PROCEDURE — 1160F RVW MEDS BY RX/DR IN RCRD: CPT | Performed by: INTERNAL MEDICINE

## 2024-04-19 PROCEDURE — 99214 OFFICE O/P EST MOD 30 MIN: CPT | Performed by: INTERNAL MEDICINE

## 2024-04-19 PROCEDURE — 83540 ASSAY OF IRON: CPT | Performed by: INTERNAL MEDICINE

## 2024-04-19 PROCEDURE — 83001 ASSAY OF GONADOTROPIN (FSH): CPT | Performed by: INTERNAL MEDICINE

## 2024-04-19 PROCEDURE — 82607 VITAMIN B-12: CPT | Performed by: INTERNAL MEDICINE

## 2024-04-19 PROCEDURE — 84466 ASSAY OF TRANSFERRIN: CPT | Performed by: INTERNAL MEDICINE

## 2024-04-19 PROCEDURE — 1159F MED LIST DOCD IN RCRD: CPT | Performed by: INTERNAL MEDICINE

## 2024-04-19 NOTE — PROGRESS NOTES
"      Mack Sorensen is a 49 y.o. female who presents with a chief complaint of   Chief Complaint   Patient presents with    Leg Swelling     C/O right leg swelling, circulation issue causing pain in foot.    Dementia     Would like to be tested for early onset.    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  Concerned about testing for herself and her Mom's doctor recommended testing.       The following portions of the patient's history were reviewed and updated as appropriate: allergies, current medications, past family history, past medical history, past social history, past surgical history and problem list.      Current Outpatient Medications:     omeprazole (priLOSEC) 20 MG capsule, Take 1 capsule by mouth Daily., Disp: 90 capsule, Rfl: 3    phentermine (ADIPEX-P) 37.5 MG tablet, Take 0.5 tablets by mouth Every Morning Before Breakfast. (Patient not taking: Reported on 4/19/2024), Disp: 30 tablet, Rfl: 0            Physical Exam  /72 (BP Location: Left arm, Patient Position: Sitting, Cuff Size: Adult)   Pulse 85   Temp 98 °F (36.7 °C) (Infrared)   Ht 172.7 cm (68\")   Wt 83.7 kg (184 lb 9.6 oz)   SpO2 98%   BMI 28.07 kg/m²     Physical Exam  Vitals reviewed.   Constitutional:       General: She is not in acute distress.     Appearance: Normal appearance.   HENT:      Head: Normocephalic and atraumatic.      Nose: Nose normal.      Mouth/Throat:      Mouth: Mucous membranes are moist.   Eyes:      Conjunctiva/sclera: Conjunctivae normal.   Pulmonary:      Effort: Pulmonary effort is normal.   Skin:     General: Skin is warm and dry.   Neurological:      General: No focal deficit present.      Mental Status: She is alert.   Psychiatric:         Mood and Affect: Mood normal.           Results for orders placed or performed in visit on 02/20/24   Urine Drug Screen - Urine, Clean Catch    Specimen: Urine, Clean Catch   Result Value Ref Range    Amphetamine, Urine Qual Negative " Bbqplj=0845 ng/mL    Barbiturates Screen, Urine Negative Eavbxk=772 ng/mL    Benzodiazepine Screen, Urine Negative Feoysd=460 ng/mL    THC Screen, Urine Negative Cutoff=20 ng/mL    Cocaine Screen, Urine Negative Bgcthq=464 ng/mL    Opiate Screen, Urine Negative Dsrztp=763 ng/mL    Oxycodone/Oxymorphone, Urine Negative Rcmltr=821 ng/mL    Phencyclidine (PCP), Urine Negative Cutoff=25 ng/mL    Methadone Screen, Urine Negative Cnqpsi=450 ng/mL    Propoxyphene Screen Negative Lcvzpg=808 ng/mL    Creatinine, Urine 89.1 20.0 - 300.0 mg/dL    pH, UA 5.9 4.5 - 8.9    Please note Comment            Diagnoses and all orders for this visit:    1. Family history of dementia (Primary)  -     Vitamin B12  -     Folate  -     Vitamin D,25-Hydroxy  -     Iron Profile  -     Ferritin    2. Leg swelling    3. Overweight (BMI 25.0-29.9)  -     Vitamin B12  -     Folate  -     Vitamin D,25-Hydroxy  -     Iron Profile  -     Ferritin    4. Anemia, unspecified type  -     Vitamin B12  -     Folate  -     Vitamin D,25-Hydroxy  -     Iron Profile  -     Ferritin    5. Encounter for screening mammogram for malignant neoplasm of breast  -     Mammo screening digital tomosynthesis bilateral w CAD        Eval for any vitamin deficiencies.  Discussed ways to prevent dementia.  Order mammogram